# Patient Record
Sex: FEMALE | Race: OTHER | HISPANIC OR LATINO | Employment: FULL TIME | URBAN - METROPOLITAN AREA
[De-identification: names, ages, dates, MRNs, and addresses within clinical notes are randomized per-mention and may not be internally consistent; named-entity substitution may affect disease eponyms.]

---

## 2017-10-20 ENCOUNTER — GENERIC CONVERSION - ENCOUNTER (OUTPATIENT)
Dept: OTHER | Facility: OTHER | Age: 27
End: 2017-10-20

## 2017-11-06 ENCOUNTER — GENERIC CONVERSION - ENCOUNTER (OUTPATIENT)
Dept: OTHER | Facility: OTHER | Age: 27
End: 2017-11-06

## 2017-11-07 ENCOUNTER — GENERIC CONVERSION - ENCOUNTER (OUTPATIENT)
Dept: OTHER | Facility: OTHER | Age: 27
End: 2017-11-07

## 2017-11-07 ENCOUNTER — ALLSCRIPTS OFFICE VISIT (OUTPATIENT)
Dept: PERINATAL CARE | Facility: MEDICAL CENTER | Age: 27
End: 2017-11-07
Payer: COMMERCIAL

## 2017-11-07 PROCEDURE — 76830 TRANSVAGINAL US NON-OB: CPT | Performed by: OBSTETRICS & GYNECOLOGY

## 2017-11-08 ENCOUNTER — GENERIC CONVERSION - ENCOUNTER (OUTPATIENT)
Dept: PERINATAL CARE | Facility: MEDICAL CENTER | Age: 27
End: 2017-11-08

## 2017-11-12 ENCOUNTER — GENERIC CONVERSION - ENCOUNTER (OUTPATIENT)
Dept: PERINATAL CARE | Facility: MEDICAL CENTER | Age: 27
End: 2017-11-12

## 2017-12-21 ENCOUNTER — GENERIC CONVERSION - ENCOUNTER (OUTPATIENT)
Dept: PERINATAL CARE | Facility: MEDICAL CENTER | Age: 27
End: 2017-12-21

## 2018-01-08 ENCOUNTER — APPOINTMENT (OUTPATIENT)
Dept: PERINATAL CARE | Facility: CLINIC | Age: 28
End: 2018-01-08
Payer: COMMERCIAL

## 2018-01-08 ENCOUNTER — GENERIC CONVERSION - ENCOUNTER (OUTPATIENT)
Dept: OTHER | Facility: OTHER | Age: 28
End: 2018-01-08

## 2018-01-08 PROCEDURE — 76817 TRANSVAGINAL US OBSTETRIC: CPT | Performed by: OBSTETRICS & GYNECOLOGY

## 2018-01-12 NOTE — PROGRESS NOTES
2017         RE: Rosa Liu                                 To: Maite Byers,   M S N , C N M    MR#: [id number]                                    [facility] [address]: [address] [place] [address]   [facility] [id number]:[facility]                             Fax: [phone]   (Exam #: [id number])      The LMP of this 27year old,  G2, P1-0-0-1 patient was SEP 10 2017, giving   her an ALLIE of 2018 and a current gestational age of 11 weeks 2 days   by dates  A sonographic examination was performed on 2017 using real   time equipment  The ultrasound examination was performed using abdominal &   vaginal techniques  The patient has a BMI of 32 2  Her blood pressure   today was 101/65  INDICATIONS      viability      Exam Types      Transvaginal      ADNEXA      The left ovary appeared normal and measured 2 5 x 1 7 x 2 4 cm with a   volume of 5 3 cc  The right ovary appeared normal and measured 3 7 x 2 2 x   3 8 cm with a volume of 16 2 cc  An anechoic right ovarian cyst is present  MASSES      1)  Right ovarian cystic mass  The cyst measured 1 60 x 1 40 x 1 90 cm,   simple in appearance with a smooth lining  Sonolucent fluid  Color doppler   flow was performed  IMPRESSION      Fetus Not Seen      CONSULT COMMENT      Thank you very much for your kind referral of Rosa Liu to the   [facility]  in [employer] on 2017 for ultrasound evaluation   and MFM assessment  Mindy Cantor is a 49-year-old  2 para 26 white   female who is currently 8  2/7 weeks gestation by an estimated due date of   2018 which is based upon menstrual dating  Mindy Cant[place] has no   complaints today  She denies vaginal bleeding  She has a history of   regular menses  She had a positive home pregnancy test on October 15  Her   prenatal course so far has been unremarkable   Her past OB history is   significant for a vaginal delivery at term in  following an   unconjugated prenatal course  Johan Maradiaga delivered a 7 lbs  3 oz  baby girl,   currently healthy  Her past medical history is unremarkable  Her past   surgical history is significant for a cholecystectomy  She denies tobacco   or illicit drug use during the pregnancy  She currently takes no   medication with the exception of a prenatal vitamin on a daily basis and   has no known drug allergy  The family genetic history is non-contributory  Transvaginal sonography reveals no gestational sac within the uterus  The   uterine contour appears normal  There is no suspicion of a uterine myoma  Free fluid is not identified in the posterior cul-de-sac  There is a   small, anechoic right ovarian cyst noted  The left ovary is normal in   appearance  There is no sonographic suspicion of an ectopic pregnancy  Today's ultrasound findings and suggested follow-up were discussed in   detail with Johan Maradiaga and by phone with your office  I discussed with   Johan Maradiaga that today's ultrasound findings may represent a failed early   pregnancy, with other possible etiologies to include incorrect pregnancy   dating and an ectopic pregnancy  Serial quantitative beta hCG levels are   recommended as initial follow-up  She will be evaluated in your office   later today and a quantitative beta hCG will be ordered  No further   ultrasound evaluation has been scheduled for Johan Maradiaga in the [facility] at this time  We would be pleased to see her for any additional   evaluation in the future, should you so choose  The face to face time, in addition to time spent discussing ultrasound   results, was approximately 10 minutes, greater than 50% of which was spent   during counseling and coordination of care  ANGELI Marie M D     Maternal-Fetal Medicine   Electronically signed 11/07/17 13:29

## 2018-01-16 NOTE — CONSULTS
I had the pleasure of evaluating your patient, Jose Delgado  My full evaluation follows:      Chief Complaint  Here for ultrasound study      History of Present Illness  Please refer to the ultrasound report for additional information  Active Problems    1  Pregnancy (V22 2) (Z34 90)    Past Medical History    · History of Cyst, kidney, acquired (593 2) (N28 1)    Surgical History    · History of Cholecystectomy Laparoscopic    Family History    · No pertinent family history    Social History    · Never a smoker   · No alcohol use   · No illicit drug use    Current Meds   1  Prenatabs Rx TABS; Therapy: (Recorded:07Nov2017) to Recorded    Allergies    1  No Known Drug Allergies    2  Seasonal    Vitals   Recorded: [id number] 76:40JX   Systolic 971, LUE, Sitting   Diastolic 65, LUE, Sitting   Height 5 ft 2 in   Weight 175 lb 0 8 oz   BMI Calculated 32 02   BSA Calculated 1 81   Pain Scale 0     Results/Data  Exam description: first trimester ultrasound  Findings: Please refer to the ultrasound report for additional information  Discussion/Summary    Please refer to the ultrasound report for additional information  The patient was counseled regarding diagnostic results, instructions for management, prognosis, impressions  Thank you very much for allowing me to participate in the care of this patient  If you have any questions, please do not hesitate to contact me        Signatures   Electronically signed by : MARANDA Mosley ; Nov 7 2017  1:22PM EST                       (Author)

## 2018-01-22 VITALS
WEIGHT: 175.05 LBS | BODY MASS INDEX: 32.21 KG/M2 | HEIGHT: 62 IN | DIASTOLIC BLOOD PRESSURE: 65 MMHG | SYSTOLIC BLOOD PRESSURE: 101 MMHG

## 2018-01-23 NOTE — PROGRESS NOTES
2018         RE: Kiana Sake                                 To: Sabino ClevelandMARANDA warner N , C N M    MR#: [id number]                                    [address]    : [address] [place] [address], [address] [place] [address]   ENC: [id number]                             Fax: [phone]   (Exam #: [id number])      The LMP of this 32year old,  G3, P1-0-1-1 patient was OCT 23 2017, giving   her an ALLIE of 2018 and a current gestational age of 5 weeks 0 days   by dates  A sonographic examination was performed on 2018 using real   time equipment  The ultrasound examination was performed using abdominal &   vaginal techniques  The patient has a BMI of 33 1  Her blood pressure   today was 100/60  Earliest US on record:   18 [facility]              Thank you very much for requesting a Maternal-Fetal Medicine evaluation   for dating  The patient was last seen in our office on  where   her uterus appeared to be non pregnant  She had a follow-up HCG level   that was less than 2  She has not had a period since August and likely   had a miscarriage in October  She has a history of a prior full-term   delivery without complications  A review of systems is otherwise negative  On exam, the patient appears well, in no acute distress, and her abdomen   is nontender  An irregular gestational sac was documented  The fetal pole was not   visualized  The yolk sac was not seen  The amnion was not documented  INDICATIONS      viability      Exam Types      Level I   Transvaginal      MEASUREMENTS (* Included In Average GA)      Mean G  Sac      4 0 cm      UTERUS      The uterus was well visualized  ADNEXA      The left ovary appeared normal and measured 4 0 x 3 8 x 1 3 cm with a   volume of 10 3 cc  The right ovary appeared normal and measured 3 5 x 2 3   x 1 6 cm with a volume of 6 7 cc        IMPRESSION      Fetus Not Seen   Blighted Ovum GENERAL COMMENT      Today's ultrasound findings are consistent with a nonviable pregnancy  There is an empty irregular gestational sac with mean sac diameter of 40   mm  I discussed today's findings with the patient in detail and we   discussed the various options including expectant management, medical   management, or surgical management  I called and spoke to the patient's   provider over the telephone to inform her of today's findings  She is   currently scheduled for follow-up in 2 weeks however I did ask that that   be moved up and the office will reach out to the patient and she will be   seen likely tomorrow  Thank you very much for allowing us to participate in the care of this   very nice patient  Should you have any questions, please do not hesitate   to contact our office  Please note, in addition to the time spent discussing the results of the   ultrasound, I spent approximately 10 minutes of face-to-face time with the   patient, greater than 50% of which was spent in counseling and the   coordination of care for this patient  ANGELI Snyder , MARANDA Martinez     Electronically signed 01/08/18 15:48

## 2018-01-24 VITALS
SYSTOLIC BLOOD PRESSURE: 100 MMHG | HEIGHT: 62 IN | DIASTOLIC BLOOD PRESSURE: 60 MMHG | BODY MASS INDEX: 33.31 KG/M2 | WEIGHT: 181 LBS

## 2018-03-31 ENCOUNTER — HOSPITAL ENCOUNTER (EMERGENCY)
Facility: HOSPITAL | Age: 28
Discharge: HOME | End: 2018-03-31
Attending: EMERGENCY MEDICINE | Admitting: EMERGENCY MEDICINE

## 2018-03-31 VITALS
HEART RATE: 76 BPM | OXYGEN SATURATION: 98 % | RESPIRATION RATE: 16 BRPM | DIASTOLIC BLOOD PRESSURE: 65 MMHG | WEIGHT: 179.68 LBS | SYSTOLIC BLOOD PRESSURE: 101 MMHG | TEMPERATURE: 97 F | BODY MASS INDEX: 32.86 KG/M2

## 2018-03-31 DIAGNOSIS — N92.0 MENORRHAGIA: Primary | ICD-10-CM

## 2018-03-31 LAB
ALBUMIN SERPL BCP-MCNC: 3.1 G/DL (ref 3.5–5)
ALP SERPL-CCNC: 83 U/L (ref 46–116)
ALT SERPL W P-5'-P-CCNC: 29 U/L (ref 12–78)
ANION GAP SERPL CALCULATED.3IONS-SCNC: 10 MMOL/L (ref 4–13)
AST SERPL W P-5'-P-CCNC: 18 U/L (ref 5–45)
B-HCG SERPL-ACNC: 4 MIU/ML
BASOPHILS # BLD AUTO: 0.03 THOUSANDS/ΜL (ref 0–0.1)
BASOPHILS NFR BLD AUTO: 0 % (ref 0–1)
BILIRUB DIRECT SERPL-MCNC: 0.16 MG/DL (ref 0–0.2)
BILIRUB SERPL-MCNC: 0.5 MG/DL (ref 0.2–1)
BUN SERPL-MCNC: 15 MG/DL (ref 5–25)
CALCIUM SERPL-MCNC: 8.9 MG/DL (ref 8.3–10.1)
CHLORIDE SERPL-SCNC: 102 MMOL/L (ref 100–108)
CO2 SERPL-SCNC: 27 MMOL/L (ref 21–32)
CREAT SERPL-MCNC: 0.79 MG/DL (ref 0.6–1.3)
EOSINOPHIL # BLD AUTO: 0.06 THOUSAND/ΜL (ref 0–0.61)
EOSINOPHIL NFR BLD AUTO: 1 % (ref 0–6)
ERYTHROCYTE [DISTWIDTH] IN BLOOD BY AUTOMATED COUNT: 11.5 % (ref 11.6–15.1)
GFR SERPL CREATININE-BSD FRML MDRD: 101 ML/MIN/1.73SQ M
GLUCOSE SERPL-MCNC: 91 MG/DL (ref 65–140)
HCG SERPL QL: ABNORMAL
HCT VFR BLD AUTO: 37.1 % (ref 34.8–46.1)
HGB BLD-MCNC: 12.3 G/DL (ref 11.5–15.4)
LIPASE SERPL-CCNC: 56 U/L (ref 73–393)
LYMPHOCYTES # BLD AUTO: 2 THOUSANDS/ΜL (ref 0.6–4.47)
LYMPHOCYTES NFR BLD AUTO: 21 % (ref 14–44)
MCH RBC QN AUTO: 30.1 PG (ref 26.8–34.3)
MCHC RBC AUTO-ENTMCNC: 33.2 G/DL (ref 31.4–37.4)
MCV RBC AUTO: 91 FL (ref 82–98)
MONOCYTES # BLD AUTO: 0.67 THOUSAND/ΜL (ref 0.17–1.22)
MONOCYTES NFR BLD AUTO: 7 % (ref 4–12)
NEUTROPHILS # BLD AUTO: 6.79 THOUSANDS/ΜL (ref 1.85–7.62)
NEUTS SEG NFR BLD AUTO: 71 % (ref 43–75)
NRBC BLD AUTO-RTO: 0 /100 WBCS
PLATELET # BLD AUTO: 255 THOUSANDS/UL (ref 149–390)
PMV BLD AUTO: 10.3 FL (ref 8.9–12.7)
POTASSIUM SERPL-SCNC: 3.6 MMOL/L (ref 3.5–5.3)
PROT SERPL-MCNC: 7.4 G/DL (ref 6.4–8.2)
RBC # BLD AUTO: 4.08 MILLION/UL (ref 3.81–5.12)
SODIUM SERPL-SCNC: 139 MMOL/L (ref 136–145)
WBC # BLD AUTO: 9.6 THOUSAND/UL (ref 4.31–10.16)

## 2018-03-31 PROCEDURE — 80076 HEPATIC FUNCTION PANEL: CPT | Performed by: PHYSICIAN ASSISTANT

## 2018-03-31 PROCEDURE — 96361 HYDRATE IV INFUSION ADD-ON: CPT

## 2018-03-31 PROCEDURE — 84702 CHORIONIC GONADOTROPIN TEST: CPT | Performed by: PHYSICIAN ASSISTANT

## 2018-03-31 PROCEDURE — 36415 COLL VENOUS BLD VENIPUNCTURE: CPT | Performed by: PHYSICIAN ASSISTANT

## 2018-03-31 PROCEDURE — 80048 BASIC METABOLIC PNL TOTAL CA: CPT | Performed by: PHYSICIAN ASSISTANT

## 2018-03-31 PROCEDURE — 84703 CHORIONIC GONADOTROPIN ASSAY: CPT | Performed by: PHYSICIAN ASSISTANT

## 2018-03-31 PROCEDURE — 99284 EMERGENCY DEPT VISIT MOD MDM: CPT

## 2018-03-31 PROCEDURE — 83690 ASSAY OF LIPASE: CPT | Performed by: PHYSICIAN ASSISTANT

## 2018-03-31 PROCEDURE — 85025 COMPLETE CBC W/AUTO DIFF WBC: CPT | Performed by: PHYSICIAN ASSISTANT

## 2018-03-31 PROCEDURE — 96360 HYDRATION IV INFUSION INIT: CPT

## 2018-03-31 RX ADMIN — SODIUM CHLORIDE 1000 ML: 0.9 INJECTION, SOLUTION INTRAVENOUS at 15:35

## 2018-03-31 NOTE — ED PROVIDER NOTES
History  Chief Complaint   Patient presents with    Vaginal Bleeding     patient is c/o vaginal bleeding x 3 weeks  states that she is changing her pad every hour  hx of a miscarriage in oct and january  denies hx of irregular periods  denies feeling lightheaded or dizzy     31 y/o female here for vaginal bleeding  Began about 2 weeks ago  States initially it was typical of her menses however over the past week becoming heavier  She is using about 1 pad per hour but states she is changing the pads before they area "really even soaked"  She states she has been having heavier menses since her miscarriage in January of this year  This would be her third menstrual cycle since that time  Associated with abdominal cramping which she states feels typical of her past menses  She takes motrin and her pain improves  She denies any possibility of pregnancy, goes on to state she has not been sexually active since her past miscarriage  Does not take anticoagulants or antiplatelets  No lightheadedness, dizziness, fever, chills, n/v  No vaginal discharge  No dysuria  History provided by:  Patient   used: No    Vaginal Bleeding   Quality:  Clots  Severity:  Moderate  Onset quality:  Gradual  Duration:  2 weeks  Timing:  Constant  Progression:  Unchanged  Chronicity:  Recurrent  Menstrual history: typically regular menses until she had 2 misscarriages most recently in January    Number of pads used:  1 per hour (changes them before they become saturated)  Possible pregnancy: no    Context: spontaneously    Context: not after intercourse, not after urination, not at rest, not during intercourse, not during urination, not foreign body and not genital trauma    Relieved by:  Nothing  Worsened by:  Nothing  Ineffective treatments:  None tried  Associated symptoms: no abdominal pain, no back pain, no dizziness, no dyspareunia, no dysuria, no fatigue, no fever, no nausea and no vaginal discharge    Risk factors: prior miscarriage and terminated pregnancy (2 months ago, has had 2 periods since that time)    Risk factors: no bleeding disorder, no hx of ectopic pregnancy, no hx of endometriosis, no gynecological surgery, does not have multiple partners, no new sexual partner, no ovarian cysts, no ovarian torsion, no PID, no STD, no STD exposure and does not have unprotected sex        None       History reviewed  No pertinent past medical history  History reviewed  No pertinent surgical history  History reviewed  No pertinent family history  I have reviewed and agree with the history as documented  Social History   Substance Use Topics    Smoking status: Never Smoker    Smokeless tobacco: Never Used    Alcohol use No        Review of Systems   Constitutional: Negative for activity change, appetite change, chills, diaphoresis, fatigue, fever and unexpected weight change  HENT: Negative for congestion, rhinorrhea, sinus pressure, sore throat and trouble swallowing  Eyes: Negative for photophobia and visual disturbance  Respiratory: Negative for apnea, cough, choking, chest tightness, shortness of breath, wheezing and stridor  Cardiovascular: Negative for chest pain, palpitations and leg swelling  Gastrointestinal: Negative for abdominal distention, abdominal pain, blood in stool, constipation, diarrhea, nausea and vomiting  Genitourinary: Positive for menstrual problem and vaginal bleeding  Negative for decreased urine volume, difficulty urinating, dyspareunia, dysuria, enuresis, flank pain, frequency, genital sores, hematuria, pelvic pain, urgency, vaginal discharge and vaginal pain  Musculoskeletal: Negative for arthralgias, back pain, myalgias, neck pain and neck stiffness  Skin: Negative for color change, pallor, rash and wound  Allergic/Immunologic: Negative  Neurological: Negative for dizziness, tremors, syncope, weakness, light-headedness, numbness and headaches  Hematological: Negative  Psychiatric/Behavioral: Negative  All other systems reviewed and are negative  Physical Exam  ED Triage Vitals [03/31/18 1507]   Temperature Pulse Respirations Blood Pressure SpO2   (!) 97 °F (36 1 °C) 90 16 125/76 99 %      Temp Source Heart Rate Source Patient Position - Orthostatic VS BP Location FiO2 (%)   Oral Monitor Sitting Right arm --      Pain Score       5           Orthostatic Vital Signs  Vitals:    03/31/18 1507 03/31/18 1549 03/31/18 1747 03/31/18 1825   BP: 125/76 107/65 105/64 101/65   Pulse: 90 71 76 76   Patient Position - Orthostatic VS: Sitting Lying Lying Lying       Physical Exam   Constitutional: She is oriented to person, place, and time  She appears well-developed and well-nourished  Non-toxic appearance  She does not have a sickly appearance  She does not appear ill  No distress  HENT:   Head: Normocephalic and atraumatic  Eyes: EOM and lids are normal  Pupils are equal, round, and reactive to light  Neck: Normal range of motion  Neck supple  Cardiovascular: Normal rate, regular rhythm, S1 normal, S2 normal, normal heart sounds, intact distal pulses and normal pulses  Exam reveals no gallop, no distant heart sounds, no friction rub and no decreased pulses  No murmur heard  Pulses:       Radial pulses are 2+ on the right side, and 2+ on the left side  Pulmonary/Chest: Effort normal and breath sounds normal  No accessory muscle usage  No apnea, no tachypnea and no bradypnea  No respiratory distress  She has no decreased breath sounds  She has no wheezes  She has no rhonchi  She has no rales  Abdominal: Soft  Normal appearance and bowel sounds are normal  She exhibits no distension and no mass  There is tenderness in the right lower quadrant, suprapubic area and left lower quadrant  There is no rigidity, no rebound and no guarding  No hernia  Mild b/l lower abdominal tenderness  No rebound or guarding      Musculoskeletal: Normal range of motion  She exhibits no edema, tenderness or deformity  Neurological: She is alert and oriented to person, place, and time  No cranial nerve deficit  GCS eye subscore is 4  GCS verbal subscore is 5  GCS motor subscore is 6  GCS 15  AAOx3  Ambulating in department without difficulty  CN II-XII grossly intact  No focal neuro deficits  Skin: Skin is warm, dry and intact  No rash noted  She is not diaphoretic  No erythema  No pallor  Psychiatric: Her speech is normal    Nursing note and vitals reviewed        ED Medications  Medications   sodium chloride 0 9 % bolus 1,000 mL (0 mL Intravenous Stopped 3/31/18 1787)       Diagnostic Studies  Results Reviewed     Procedure Component Value Units Date/Time    hCG, quantitative [[id number]]  (Normal) Collected:  03/31/18 1535    Lab Status:  Final result Specimen:  Blood from Arm, Left Updated:  03/31/18 1632     HCG, Quant 4 mIU/mL     Narrative:          Expected Ranges:     Approximate               Approximate HCG  Gestation age          Concentration ( mIU/mL)  _____________          ______________________   [facility]                      HCG values  0 2-1                       5-50  1-2                           2-3                         100-5000  3-4                         500-67535  4-5                         1000-42164  5-6                         [id number]  6-8                         [id number]  8-12                        25258-289193    Lipase [[id number]]  (Abnormal) Collected:  03/31/18 1535    Lab Status:  Final result Specimen:  Blood from Arm, Left Updated:  03/31/18 1603     Lipase 56 (L) u/L     Hepatic function panel [[id number]]  (Abnormal) Collected:  03/31/18 1535    Lab Status:  Final result Specimen:  Blood from Arm, Left Updated:  03/31/18 1603     Total Bilirubin 0 50 mg/dL      Bilirubin, Direct 0 16 mg/dL      Alkaline Phosphatase 83 U/L      AST 18 U/L      ALT 29 U/L      Total Protein 7 4 g/dL      Albumin 3 1 (L) g/dL hCG, qualitative pregnancy [[id number]]  (Abnormal) Collected:  03/31/18 1535    Lab Status:  Final result Specimen:  Blood from Arm, Left Updated:  03/31/18 1603     Preg, Serum Borderline (A)    Basic metabolic panel [[id number]] Collected:  03/31/18 1535    Lab Status:  Final result Specimen:  Blood from Arm, Left Updated:  03/31/18 1557     Sodium 139 mmol/L      Potassium 3 6 mmol/L      Chloride 102 mmol/L      CO2 27 mmol/L      Anion Gap 10 mmol/L      BUN 15 mg/dL      Creatinine 0 79 mg/dL      Glucose 91 mg/dL      Calcium 8 9 mg/dL      eGFR 101 ml/min/1 73sq m     Narrative:         National Kidney Disease Education Program recommendations are as follows:  GFR calculation is accurate only with a steady state creatinine  Chronic Kidney disease less than 60 ml/min/1 73 sq  meters  Kidney failure less than 15 ml/min/1 73 sq  meters  CBC and differential [[id number]]  (Abnormal) Collected:  03/31/18 1535    Lab Status:  Final result Specimen:  Blood from Arm, Left Updated:  03/31/18 1542     WBC 9 60 Thousand/uL      RBC 4 08 Million/uL      Hemoglobin 12 3 g/dL      Hematocrit 37 1 %      MCV 91 fL      MCH 30 1 pg      MCHC 33 2 g/dL      RDW 11 5 (L) %      MPV 10 3 fL      Platelets 912 Thousands/uL      nRBC 0 /100 WBCs      Neutrophils Relative 71 %      Lymphocytes Relative 21 %      Monocytes Relative 7 %      Eosinophils Relative 1 %      Basophils Relative 0 %      Neutrophils Absolute 6 79 Thousands/µL      Lymphocytes Absolute 2 00 Thousands/µL      Monocytes Absolute 0 67 Thousand/µL      Eosinophils Absolute 0 06 Thousand/µL      Basophils Absolute 0 03 Thousands/µL                  No orders to display              Procedures  Procedures       Phone Contacts  ED Phone Contact    ED Course  ED Course as of Mar 31 1839   Sat Mar 31, 2018   1832 Spoke with OB on call Dr Wayne Graham who recommends repeat quant in 48 hours and follow up with her OB                                  MDM  Number of Diagnoses or Management Options  Menorrhagia: new and requires workup  Diagnosis management comments: DDx including but not limited to: ectopic pregnancy, threatened , missed , incomplete , anemia, coagulopathy, DUB, tumor, retained products of conception, PCOS; doubt ovarian torsion or ruptured ovarian cyst         Amount and/or Complexity of Data Reviewed  Clinical lab tests: ordered and reviewed  Discuss the patient with other providers: yes (Dr Edgar Cobb)  Independent visualization of images, tracings, or specimens: yes    Risk of Complications, Morbidity, and/or Mortality  Presenting problems: moderate  Management options: low  General comments: 24-year-old female here for vaginal bleeding  Hemoglobin is normal  She had borderline qualitative HCG  Follow up by quantitative which was 4  Spoke with with on call OB, technically speaking this is negative  She should however follow up with repeat HCG on Monday/48 hours from now  She should also follow up with her gyn for her menorrhagia  We discussed return parameters  She is not tachycardic she is not hypertensive  She is not acutely anemic  We discussed return parameters in follow-up  Patient understands and agrees the plan  Patient Progress  Patient progress: stable    CritCare Time    Disposition  Final diagnoses:   Menorrhagia     Time reflects when diagnosis was documented in both MDM as applicable and the Disposition within this note     Time User Action Codes Description Comment    3/31/2018  6:34 PM Julio Boast Add [N92 0] Menorrhagia       ED Disposition     ED Disposition Condition Comment    Discharge  Ted Sy discharge to home/self care      Condition at discharge: Good        Follow-up Information     Follow up With Specialties Details Why Contact Info    Your OBGYN  Call in 2 days for follow up appointment previously establish        Patient's Medications    No medications on file       Outpatient Discharge Orders  hCG, quantitative   Standing Status: Future  Standing Exp   Date: 03/31/19         ED Provider  Electronically Signed by           Yokasta Estrada PA-C  03/31/18 0826

## 2018-03-31 NOTE — DISCHARGE INSTRUCTIONS
Return to the Emergency Department sooner if increased bleeding, pain, fever, vomiting, difficulty breathing or urinating, weakness, dizziness  Menorrhagia   WHAT YOU NEED TO KNOW:   Menorrhagia is heavy menstrual bleeding for more than 7 days or severe menstrual bleeding for less than 7 days  Your menstrual bleeding and cramping are so heavy that you have trouble doing your usual daily activities  Your monthly period may also occur more often, and you may bleed between periods  Menorrhagia is common in adolescence and around menopause  DISCHARGE INSTRUCTIONS:   Call 911 for any of the following:   · You have chest pain and shortness of breath  · Your heart is fluttering or beating faster than usual for you  Return to the emergency department if:   · You feel dizzy when you stand  · You feel confused  · You have severe abdominal pain, nausea, and vomiting  · Your skin or the whites of your eyes turn yellow  Contact your healthcare provider if:   · You need to change your pad or tampon more than 1 time per hour, for several hours in a row  · You feel more weak and tired than usual      · You have new coldness in your hands and feet  · You have questions or concerns about your condition or care  Medicines: You may need any of the following:  · Iron supplements  may be given if your blood iron level decreases because of heavy bleeding  · NSAIDs , such as ibuprofen, treat menstrual cramps  This medicine is available with or without a doctor's order  NSAIDs can cause stomach bleeding or kidney problems in certain people  If you take blood thinner medicine, always ask your healthcare provider if NSAIDs are safe for you  Always read the medicine label and follow directions  · Hormones  help slow or stop your bleeding and make your monthly periods more regular  This medicine may be given as birth control pills or an intrauterine device (IUD)  · Take your medicine as directed  Contact your healthcare provider if you think your medicine is not helping or if you have side effects  Tell him of her if you are allergic to any medicine  Keep a list of the medicines, vitamins, and herbs you take  Include the amounts, and when and why you take them  Bring the list or the pill bottles to follow-up visits  Carry your medicine list with you in case of an emergency  Manage your symptoms:   · Keep a supply of pads or tampons with you at all times  If possible, stay close to a bathroom  · Apply heat  on your abdomen for 20 to 30 minutes every 2 hours for as many days as directed  Heat helps decrease pain and cramps  Follow up with your healthcare provider as directed: You may need regular pelvic exams with Pap smears to monitor your condition  Write down your questions so you remember to ask them during your visits  © 2017 [address] [employer] Information is for End User's use only and may not be sold, redistributed or otherwise used for commercial purposes  All illustrations and images included in CareNotes® are the copyrighted property of [employer]  or Fazal Shearer  The above information is an  only  It is not intended as medical advice for individual conditions or treatments  Talk to your doctor, nurse or pharmacist before following any medical regimen to see if it is safe and effective for you

## 2018-03-31 NOTE — ED NOTES
Patient requesting to use restroom and changed clothes prior to triage     Alma Casarez RN  03/31/18 1377

## 2018-04-11 ENCOUNTER — OFFICE VISIT (OUTPATIENT)
Dept: OBGYN CLINIC | Age: 28
End: 2018-04-11
Payer: COMMERCIAL

## 2018-04-11 VITALS
BODY MASS INDEX: 31.54 KG/M2 | DIASTOLIC BLOOD PRESSURE: 72 MMHG | HEIGHT: 63 IN | SYSTOLIC BLOOD PRESSURE: 110 MMHG | WEIGHT: 178 LBS

## 2018-04-11 DIAGNOSIS — N92.1 MENORRHAGIA WITH IRREGULAR CYCLE: Primary | ICD-10-CM

## 2018-04-11 PROCEDURE — 99203 OFFICE O/P NEW LOW 30 MIN: CPT | Performed by: NURSE PRACTITIONER

## 2018-04-11 RX ORDER — NORETHINDRONE ACETATE AND ETHINYL ESTRADIOL 1MG-20(21)
1 KIT ORAL DAILY
Qty: 28 TABLET | Refills: 2 | Status: SHIPPED | OUTPATIENT
Start: 2018-04-11 | End: 2018-06-20 | Stop reason: SDUPTHER

## 2018-04-11 NOTE — PROGRESS NOTES
Assessment/Plan:    No problem-specific Assessment & Plan notes found for this encounter  Diagnoses and all orders for this visit:    Menorrhagia with irregular cycle  -     US pelvis complete w transvaginal; Future  -     TSH+Free T4      Return in 3 mos for re-evaluation  Subjective:      Patient ID: Trish Ramirez is a 32 y o  female  Pleasant 32 y o  here for menstrual complaints  She had gone to the ER 2 wks ago and advised to f/u with Gyn  HCG at that time was 4  She did repeat it a few days ago  Denies fever, pelvic pain or dyspareunia  Denies vaginal issues  She was using about 1 pad per hour but states she was changing the pads before they were "really even soaked"  She states she has been having heavier menses since her miscarriage in January of this year (blighted ovum)  She also has a miscarriage 10/2017  She was seeing an endocrinologist for an adrenal issue at [facility]  Her menses are associated with abdominal cramping which she states feels is typical of her past menses  She takes motrin and her pain improves  She denies any possibility of pregnancy, goes on to state she has not been sexually active since her past miscarriage  Does not take anticoagulants or antiplatelets  No lightheadedness, dizziness, fever, chills, n/v  No vaginal discharge  No dysuria  Monogomous with 1 partner  She would like to start ocp to help regulate her menses  States nml paps and last one 10/2017  The following portions of the patient's history were reviewed and updated as appropriate:   She  has a past medical history of Kidney stones  She   Patient Active Problem List    Diagnosis Date Noted    Menorrhagia with irregular cycle 04/11/2018     She  has a past surgical history that includes Cholecystectomy  Her family history includes Colon cancer in her maternal grandfather; No Known Problems in her father and mother  She  reports that she has never smoked   She has never used smokeless tobacco  She reports that she drinks alcohol  She reports that she does not use drugs  No current outpatient prescriptions on file  No current facility-administered medications for this visit  She has No Known Allergies  OB History    Para Term  AB Living   3 1 1   2 1   SAB TAB Ectopic Multiple Live Births   2       1      # Outcome Date GA Lbr Glenn/2nd Weight Sex Delivery Anes PTL Lv   3 SAB            2 SAB            1 Term                 Has a 10 yo daughter  Review of Systems   Constitutional: Negative for chills, fatigue, fever and unexpected weight change  Respiratory: Negative for shortness of breath  Gastrointestinal: Negative for anal bleeding, blood in stool, constipation and diarrhea  Genitourinary: Negative for difficulty urinating, dysuria and hematuria  Objective:      /72 (BP Location: Right arm, Patient Position: Sitting)   Ht 5' 3" (1 6 m)   Wt 80 7 kg (178 lb)   LMP 2018   Breastfeeding? No   BMI 31 53 kg/m²          Physical Exam   Constitutional: She appears well-developed and well-nourished  She is cooperative  No distress  Abdominal: Soft  Hernia confirmed negative in the right inguinal area and confirmed negative in the left inguinal area  Genitourinary: No labial fusion  There is no rash, tenderness, lesion or injury on the right labia  There is no rash, tenderness, lesion or injury on the left labia  Uterus is not deviated, not enlarged, not fixed and not tender  Cervix exhibits no motion tenderness and no friability  Right adnexum displays fullness  Right adnexum displays no mass and no tenderness  Left adnexum displays no mass, no tenderness and no fullness  No erythema, tenderness or bleeding in the vagina  No foreign body in the vagina  No signs of injury around the vagina  Genitourinary Comments: Possible right ovarian cyst /fullness palpated, nontender  Lymphadenopathy:        Right: No inguinal adenopathy present          Left: No inguinal adenopathy present  Skin: She is not diaphoretic

## 2018-04-11 NOTE — PATIENT INSTRUCTIONS
Birth Control Pills   WHAT YOU NEED TO KNOW:   What are birth control pills? Birth control pills are also called oral contraceptives, or the pill  It is medicine that helps prevent pregnancy  Birth control pills work by preventing ovulation  Ovulation is when the ovaries make and release an egg cell each month  If this egg gets fertilized by sperm, pregnancy occurs  Birth control pills may also help to prevent pregnancy by keeping sperm from fertilizing an egg  What may be done before I can start taking birth control pills? You need to see your healthcare provider to get a prescription  Any of the following may be done before your healthcare provider gives you a prescription:  · Your healthcare provider will ask you about diseases and illnesses you have had in the past  He will check your risk for blood clots, heart conditions, or stroke  He will also check your blood pressure, and may do a breast and pelvic exam  A Pap smear may also be done during the pelvic exam  This is a test to make sure you do not have abnormal changes on your cervix  You may need other tests, such as a urine test, to make sure you are not pregnant  · Your healthcare provider will ask if you take any medicines and if you smoke  Smoking increases your risk for stroke, heart attack, or a blood clot in your lungs  If you smoke, you should not take certain kinds of birth control pills  What are the advantages of birth control pills? When birth control pills are used correctly, the chances of getting pregnant are very low  Birth control pills may help decrease bleeding and pain during your monthly period  They may also help prevent cancer of the uterus and ovaries  What are the disadvantages of birth control pills? You may have sudden changes in your mood or feelings while you take birth control pills  You may have nausea and decreased sex drive  You may have an increased appetite and rapid weight gain   You may also have bleeding in between periods, less frequent periods, vaginal dryness, and breast pain  Birth control pills will not protect you from sexually transmitted infections  Rarely, some birth control pills can increase your risk for a blood clot  This may become life-threatening  What should I do if I decide I want to get pregnant? If you are planning to have a baby, ask your healthcare provider when you may stop taking your birth control pills  It may take some time for you to start ovulating again  Ask your healthcare provider for more information about pregnancy after birth control pills  When should I start taking birth control pills after I have a baby? If you are not breastfeeding, you may start taking birth control pills 3 weeks after you give birth  You may be able to take certain types of birth control pills if you are breastfeeding  These pills can be started from 6 weeks to 6 months after you give birth  Ask your healthcare provider for more information about when to start taking birth control pills after you give birth  When should I contact my healthcare provider? · You have forgotten to take a birth control pill  · You have mood changes, such as depression, since starting birth control pills  · You have nausea or you are vomiting  · You have severe abdominal pain  · You missed a period and have questions or concerns about being pregnant  · You still have bleeding 4 months after taking birth control pills correctly  · You have questions or concerns about your condition or care  When should I seek immediate care or call 911? · Your arm or leg feels warm, tender, and painful  It may look swollen and red  · You feel lightheaded, short of breath, and have chest pain  · You cough up blood      · You have any of the following signs of a stroke:      ¨ Numbness or drooping on one side of your face     ¨ Weakness in an arm or leg    ¨ Confusion or difficulty speaking    ¨ Dizziness, a severe headache, or vision loss    · You have severe pain, numbness, or swelling in your arms or legs  CARE AGREEMENT:   You have the right to help plan your care  Learn about your health condition and how it may be treated  Discuss treatment options with your caregivers to decide what care you want to receive  You always have the right to refuse treatment  The above information is an  only  It is not intended as medical advice for individual conditions or treatments  Talk to your doctor, nurse or pharmacist before following any medical regimen to see if it is safe and effective for you  © 2017 [address] [facility] Information is for End User's use only and may not be sold, redistributed or otherwise used for commercial purposes  All illustrations and images included in CareNotes® are the copyrighted property of [employer]  or [employer]

## 2018-04-19 ENCOUNTER — HOSPITAL ENCOUNTER (OUTPATIENT)
Dept: RADIOLOGY | Facility: HOSPITAL | Age: 28
Discharge: HOME | End: 2018-04-19
Payer: COMMERCIAL

## 2018-04-19 DIAGNOSIS — N92.1 MENORRHAGIA WITH IRREGULAR CYCLE: ICD-10-CM

## 2018-04-19 PROCEDURE — 76830 TRANSVAGINAL US NON-OB: CPT

## 2018-04-19 PROCEDURE — 76856 US EXAM PELVIC COMPLETE: CPT

## 2018-04-20 ENCOUNTER — TELEPHONE (OUTPATIENT)
Dept: OBGYN CLINIC | Facility: CLINIC | Age: 28
End: 2018-04-20

## 2018-04-20 NOTE — TELEPHONE ENCOUNTER
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

## 2018-04-27 ENCOUNTER — TELEPHONE (OUTPATIENT)
Dept: OBGYN CLINIC | Facility: CLINIC | Age: 28
End: 2018-04-27

## 2018-06-20 DIAGNOSIS — N92.1 MENORRHAGIA WITH IRREGULAR CYCLE: Primary | ICD-10-CM

## 2018-06-20 RX ORDER — NORETHINDRONE ACETATE AND ETHINYL ESTRADIOL 1MG-20(21)
1 KIT ORAL DAILY
Qty: 28 TABLET | Refills: 3 | Status: SHIPPED | OUTPATIENT
Start: 2018-06-20 | End: 2018-09-13 | Stop reason: SDUPTHER

## 2018-09-13 DIAGNOSIS — N92.1 MENORRHAGIA WITH IRREGULAR CYCLE: Primary | ICD-10-CM

## 2018-09-14 RX ORDER — NORETHINDRONE ACETATE AND ETHINYL ESTRADIOL 1MG-20(21)
1 KIT ORAL DAILY
Qty: 90 TABLET | Refills: 0 | Status: SHIPPED | OUTPATIENT
Start: 2018-09-14